# Patient Record
Sex: FEMALE | Race: BLACK OR AFRICAN AMERICAN | NOT HISPANIC OR LATINO | Employment: FULL TIME | URBAN - METROPOLITAN AREA
[De-identification: names, ages, dates, MRNs, and addresses within clinical notes are randomized per-mention and may not be internally consistent; named-entity substitution may affect disease eponyms.]

---

## 2020-05-14 ENCOUNTER — OFFICE VISIT (OUTPATIENT)
Dept: OBGYN CLINIC | Facility: OTHER | Age: 60
End: 2020-05-14
Payer: COMMERCIAL

## 2020-05-14 VITALS
HEIGHT: 63 IN | BODY MASS INDEX: 38.45 KG/M2 | WEIGHT: 217 LBS | SYSTOLIC BLOOD PRESSURE: 116 MMHG | DIASTOLIC BLOOD PRESSURE: 86 MMHG | HEART RATE: 110 BPM

## 2020-05-14 DIAGNOSIS — M75.41 IMPINGEMENT SYNDROME OF RIGHT SHOULDER: ICD-10-CM

## 2020-05-14 DIAGNOSIS — M75.31 CALCIFIC TENDONITIS OF RIGHT SHOULDER: ICD-10-CM

## 2020-05-14 DIAGNOSIS — S46.811S TRAUMATIC TEAR OF SUPRASPINATUS TENDON OF RIGHT SHOULDER, SEQUELA: ICD-10-CM

## 2020-05-14 DIAGNOSIS — M24.811 INTERNAL DERANGEMENT OF RIGHT SHOULDER: Primary | ICD-10-CM

## 2020-05-14 PROCEDURE — 99203 OFFICE O/P NEW LOW 30 MIN: CPT | Performed by: ORTHOPAEDIC SURGERY

## 2020-05-14 RX ORDER — GLUCOSAMINE/CHONDR SU A SOD 750-600 MG
1 TABLET ORAL DAILY
COMMUNITY
Start: 2020-04-06

## 2020-05-14 RX ORDER — ATORVASTATIN CALCIUM 10 MG/1
5 TABLET, FILM COATED ORAL DAILY
COMMUNITY
Start: 2020-03-25 | End: 2020-09-21

## 2020-05-14 RX ORDER — ASPIRIN 81 MG/1
81 TABLET ORAL DAILY
COMMUNITY

## 2020-05-14 RX ORDER — OMEPRAZOLE 40 MG/1
40 CAPSULE, DELAYED RELEASE ORAL DAILY
COMMUNITY
Start: 2020-01-08

## 2020-05-14 RX ORDER — LABETALOL 100 MG/1
100 TABLET, FILM COATED ORAL 2 TIMES DAILY
COMMUNITY
Start: 2020-05-10

## 2021-04-16 ENCOUNTER — IMMUNIZATIONS (OUTPATIENT)
Dept: FAMILY MEDICINE CLINIC | Facility: HOSPITAL | Age: 61
End: 2021-04-16

## 2021-04-16 DIAGNOSIS — Z23 ENCOUNTER FOR IMMUNIZATION: Primary | ICD-10-CM

## 2021-04-16 PROCEDURE — 0001A SARS-COV-2 / COVID-19 MRNA VACCINE (PFIZER-BIONTECH) 30 MCG: CPT

## 2021-04-16 PROCEDURE — 91300 SARS-COV-2 / COVID-19 MRNA VACCINE (PFIZER-BIONTECH) 30 MCG: CPT

## 2021-05-11 ENCOUNTER — IMMUNIZATIONS (OUTPATIENT)
Dept: FAMILY MEDICINE CLINIC | Facility: HOSPITAL | Age: 61
End: 2021-05-11

## 2021-05-11 DIAGNOSIS — Z23 ENCOUNTER FOR IMMUNIZATION: Primary | ICD-10-CM

## 2021-05-11 PROCEDURE — 0002A SARS-COV-2 / COVID-19 MRNA VACCINE (PFIZER-BIONTECH) 30 MCG: CPT

## 2021-05-11 PROCEDURE — 91300 SARS-COV-2 / COVID-19 MRNA VACCINE (PFIZER-BIONTECH) 30 MCG: CPT

## 2021-07-27 ENCOUNTER — OFFICE VISIT (OUTPATIENT)
Dept: AUDIOLOGY | Age: 61
End: 2021-07-27
Payer: COMMERCIAL

## 2021-07-27 DIAGNOSIS — H90.3 SENSORY HEARING LOSS, BILATERAL: Primary | ICD-10-CM

## 2021-07-27 PROCEDURE — 92567 TYMPANOMETRY: CPT | Performed by: AUDIOLOGIST

## 2021-07-27 PROCEDURE — 92557 COMPREHENSIVE HEARING TEST: CPT | Performed by: AUDIOLOGIST

## 2021-07-27 PROCEDURE — 92591 HB HEARING AID EXAM BOTH EARS: CPT | Performed by: AUDIOLOGIST

## 2021-07-27 NOTE — PROGRESS NOTES
HEARING EVALUATION    Name:  Salena Guajardo  :  1960  Age:  63 y o  Date of Evaluation: 21     History: Known Hearing Loss binaurally  Reason for visit: Salena Guajardo is being seen today at the request of Dr Leona alejandro  provider found for an evaluation of hearing  Patient reports she has worn hearing aids for the past 11 to 12 years  She has extensive family history of hearing loss including her mother, siblings, aunts, uncles and cousins  EVALUATION:    Otoscopic Evaluation:   Right Ear: Minimum cerumen    Left Ear: Minimum cerumen     Tympanometry:   Right: Type A - normal middle ear pressure and compliance   Left: Type A - normal middle ear pressure and compliance    Audiogram Results: Moderate to severe sensorineural hearing loss in a cookie bite configuration bilaterally  WRS is excellent bilaterally  *see attached audiogram      RECOMMENDATIONS:  Annual hearing eval, Return to [facility]  for F/U and Copy to Patient/Caregiver    PATIENT EDUCATION:   Discussed results and recommendations with patient  Questions were addressed and the patient was encouraged to contact our department should concerns arise        Naren Jean ,  CCC-A  Clinical Audiologist

## 2021-07-27 NOTE — PROGRESS NOTES
Hearing Aid Evaluation  Name:  Estephania Mc  :  1960  Age:  63 y o  Date of Evaluation: 21     Audiologic Results: Audiologic testing was performed on 2021, testing revealed moderate to severe sensorineural hearing loss bilaterally  Amplification is recommended binaurally    Hearing Aid Evaluation:  Hearing aid styles and technology were discussed with the patient  Possible hearing aid options, programs, and accessories were discussed  Pricing options and technology levels were discussed to determine the appropriate device to recommend  The patient was referred by [facility]  She currently wears Oticon Opn miniRITE hearing aids  Recommendation/Quote: The first hearing aid recommendation is  Oticon More 1 miniRITE R  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

## 2021-09-09 NOTE — PROGRESS NOTES
Progress Note    Name:  Jamaal Ocasio  :  1960  Age:  63 y o  Date of Evaluation: 21     Patients hearing aids and connect clip arrived  Oticon More 2 miniRITE-R  Right SN: [id number]  Left SN: [id number]  Warranty: 10/07/2024     SN: [id number]  Warranty: 10/07/2024    Connect Clip SN: [id number]  Warranty: 10/07/2022    Patient scheduled with Paul Hassan on         Jahaira Wallace , CCC-A  Clinical Audiologist

## 2021-09-17 ENCOUNTER — OFFICE VISIT (OUTPATIENT)
Dept: AUDIOLOGY | Age: 61
End: 2021-09-17
Payer: COMMERCIAL

## 2021-09-17 DIAGNOSIS — H90.3 SENSORY HEARING LOSS, BILATERAL: Primary | ICD-10-CM

## 2021-09-17 PROCEDURE — V5261 HEARING AID, DIGIT, BIN, BTE: HCPCS | Performed by: AUDIOLOGIST

## 2021-09-17 PROCEDURE — V5274 ALD UNSPECIFIED: HCPCS | Performed by: AUDIOLOGIST

## 2021-09-17 PROCEDURE — V5160 DISPENSING FEE BINAURAL: HCPCS | Performed by: AUDIOLOGIST

## 2021-09-17 NOTE — PROGRESS NOTES
Hearing Aid Fitting    Name:  Estephania Mc  :  1960  Age:  63 y o  Date of Evaluation: 21     Estephania Mc is being see today to be fit with new hearing aids  Patient is fit with Oticon More 2 hearing aid(s)  Right serial number [id number]  Left serial number [id number]  Warranty date: 10/7/2024 (Loss/Damage and repair)  Ear mold Battery  Dome   Right --- R 2-85 8DV   Left --- R 2-85 8DV     The hearing aid(s) were adjusted based on the patient's most recent audiogram and patient comfort  Patient noted good sound quality, and was happy with the fitting  Care and cleaning of the hearing aids was reviewed  Domes, filters, and batteries and user manual were reviewed with the patient  Insertion and removal of the hearing aids was done  The patient practiced insertion and removal of the devices in the office, they demonstrated excellent ability to manipulate the hearing aids  Telephone use was reviewed with the patient  The patient expressed satisfaction with the hearing aids  Recommendation:   Follow up in two weeks           Naren Cormier , CCC-A  Clinical Audiologist

## 2021-09-20 ENCOUNTER — OFFICE VISIT (OUTPATIENT)
Dept: AUDIOLOGY | Age: 61
End: 2021-09-20

## 2021-09-20 DIAGNOSIS — H90.3 SENSORY HEARING LOSS, BILATERAL: Primary | ICD-10-CM

## 2021-09-20 NOTE — PROGRESS NOTES
Progress Note    Name:  Estephania Mc  :  1960  Age:  63 y o  Date of Evaluation: 21     Patient walked in and stated the "wires are on backwards"  Patient was referring to the sport locks  Visual inspection revealed they were on backwards and curling in the wrong direction in the kami bowl  Patient stated she changed them because they were too long and put one of her old sport locks on  Patient was counseled that they were put on backwards and reviewed how they should be fit  Two new sport locks were added  Patient was happy with the fit of the sport locks         Jahaira Hill , CCC-A  Clinical Audiologist

## 2021-10-04 ENCOUNTER — OFFICE VISIT (OUTPATIENT)
Dept: AUDIOLOGY | Age: 61
End: 2021-10-04

## 2021-10-04 DIAGNOSIS — H90.3 SENSORY HEARING LOSS, BILATERAL: Primary | ICD-10-CM

## 2022-05-02 ENCOUNTER — OFFICE VISIT (OUTPATIENT)
Dept: AUDIOLOGY | Age: 62
End: 2022-05-02

## 2022-05-02 DIAGNOSIS — H90.3 SENSORY HEARING LOSS, BILATERAL: Primary | ICD-10-CM

## 2022-05-02 NOTE — PROGRESS NOTES
Hearing Aid Visit:    Name:  Madina Ortiz  :  1960  Age:  63 y o  Date of Evaluation: 22     Madina Ortiz is being seen for a hearing aid visit  Patient is fit with Oticon More 2 hearing aid(s)  Right serial [id number]  Left serial [id number]  Warranty date: 10/7/2024 (Loss/Damage and repair)     Dome/Earmold: 10DB   : Right  / Left  with sport lock     Patient reports hearing aids not paired to phone  Action:  The patients hearing aid(s) were connected to their cell phone via bluetooth  Volume control via the "triple click" of the power button on the phone was reviewed  Recommendations: Follow up PRN        Jahaira Nair , ARIADNA-A  Clinical Audiologist

## 2023-01-23 ENCOUNTER — OFFICE VISIT (OUTPATIENT)
Dept: AUDIOLOGY | Age: 63
End: 2023-01-23

## 2023-01-23 DIAGNOSIS — H90.3 SENSORY HEARING LOSS, BILATERAL: Primary | ICD-10-CM

## 2023-10-30 ENCOUNTER — OFFICE VISIT (OUTPATIENT)
Dept: AUDIOLOGY | Age: 63
End: 2023-10-30

## 2023-10-30 DIAGNOSIS — H90.3 SENSORY HEARING LOSS, BILATERAL: Primary | ICD-10-CM

## 2023-10-30 NOTE — PROGRESS NOTES
Hearing Aid Visit:    Name:  Shmuel Liang  :  1960  Age:  66 y.o. Date of Evaluation: 10/30/23     Shmuel Liang is being seen for a hearing aid visit. Patient is fit with [employer] More 2 hearing aid(s). Right serial number [id number]. Left serial number [id number]. Warranty date: 10/7/2024 (Loss/Damage and repair). Dome/Earmold: 8DB   : Right  / Left 3/85 with sport lock     Patient reports the left hearing aid is causing some discomfort. Action:  Cleaned and checked both hearing aids. Replaced domes and filters. Replaced left  wire  with a 3/85 for better fit. Dispensed a pack of domes and filters. Recommendations: Follow PRN. Get set up with [facility] in [place]. [facility] audiology information.       Shirley Terrell, [facility]-A  Clinical Audiologist

## 2025-05-01 ENCOUNTER — APPOINTMENT (EMERGENCY)
Dept: RADIOLOGY | Facility: HOSPITAL | Age: 65
End: 2025-05-01
Payer: COMMERCIAL

## 2025-05-01 ENCOUNTER — HOSPITAL ENCOUNTER (EMERGENCY)
Facility: HOSPITAL | Age: 65
Discharge: LEFT BEFORE DISCHARGE | End: 2025-05-01
Attending: STUDENT IN AN ORGANIZED HEALTH CARE EDUCATION/TRAINING PROGRAM
Payer: COMMERCIAL

## 2025-05-01 VITALS
RESPIRATION RATE: 18 BRPM | OXYGEN SATURATION: 98 % | HEART RATE: 71 BPM | DIASTOLIC BLOOD PRESSURE: 92 MMHG | SYSTOLIC BLOOD PRESSURE: 197 MMHG | TEMPERATURE: 98 F

## 2025-05-01 DIAGNOSIS — R93.89 ABNORMAL CT SCAN: ICD-10-CM

## 2025-05-01 DIAGNOSIS — G43.909 MIGRAINE: Primary | ICD-10-CM

## 2025-05-01 LAB
ANION GAP SERPL CALCULATED.3IONS-SCNC: 13 MMOL/L (ref 4–13)
BASOPHILS # BLD AUTO: 0.05 THOUSANDS/ÂΜL (ref 0–0.1)
BASOPHILS NFR BLD AUTO: 1 % (ref 0–1)
BUN SERPL-MCNC: 13 MG/DL (ref 5–25)
CALCIUM SERPL-MCNC: 10 MG/DL (ref 8.4–10.2)
CHLORIDE SERPL-SCNC: 104 MMOL/L (ref 96–108)
CO2 SERPL-SCNC: 21 MMOL/L (ref 21–32)
CREAT SERPL-MCNC: 0.87 MG/DL (ref 0.6–1.3)
EOSINOPHIL # BLD AUTO: 0.18 THOUSAND/ÂΜL (ref 0–0.61)
EOSINOPHIL NFR BLD AUTO: 2 % (ref 0–6)
ERYTHROCYTE [DISTWIDTH] IN BLOOD BY AUTOMATED COUNT: 14.2 % (ref 11.6–15.1)
GFR SERPL CREATININE-BSD FRML MDRD: 71 ML/MIN/1.73SQ M
GLUCOSE SERPL-MCNC: 97 MG/DL (ref 65–140)
HCT VFR BLD AUTO: 48.8 % (ref 34.8–46.1)
HGB BLD-MCNC: 15.9 G/DL (ref 11.5–15.4)
IMM GRANULOCYTES # BLD AUTO: 0.03 THOUSAND/UL (ref 0–0.2)
IMM GRANULOCYTES NFR BLD AUTO: 0 % (ref 0–2)
LYMPHOCYTES # BLD AUTO: 2.22 THOUSANDS/ÂΜL (ref 0.6–4.47)
LYMPHOCYTES NFR BLD AUTO: 28 % (ref 14–44)
MCH RBC QN AUTO: 28.6 PG (ref 26.8–34.3)
MCHC RBC AUTO-ENTMCNC: 32.6 G/DL (ref 31.4–37.4)
MCV RBC AUTO: 88 FL (ref 82–98)
MONOCYTES # BLD AUTO: 0.75 THOUSAND/ÂΜL (ref 0.17–1.22)
MONOCYTES NFR BLD AUTO: 10 % (ref 4–12)
NEUTROPHILS # BLD AUTO: 4.62 THOUSANDS/ÂΜL (ref 1.85–7.62)
NEUTS SEG NFR BLD AUTO: 59 % (ref 43–75)
NRBC BLD AUTO-RTO: 0 /100 WBCS
PLATELET # BLD AUTO: 251 THOUSANDS/UL (ref 149–390)
PMV BLD AUTO: 9.3 FL (ref 8.9–12.7)
POTASSIUM SERPL-SCNC: 3.8 MMOL/L (ref 3.5–5.3)
RBC # BLD AUTO: 5.55 MILLION/UL (ref 3.81–5.12)
SODIUM SERPL-SCNC: 138 MMOL/L (ref 135–147)
WBC # BLD AUTO: 7.85 THOUSAND/UL (ref 4.31–10.16)

## 2025-05-01 PROCEDURE — 96366 THER/PROPH/DIAG IV INF ADDON: CPT

## 2025-05-01 PROCEDURE — 70496 CT ANGIOGRAPHY HEAD: CPT

## 2025-05-01 PROCEDURE — 96365 THER/PROPH/DIAG IV INF INIT: CPT

## 2025-05-01 PROCEDURE — 70498 CT ANGIOGRAPHY NECK: CPT

## 2025-05-01 PROCEDURE — 96361 HYDRATE IV INFUSION ADD-ON: CPT

## 2025-05-01 PROCEDURE — 99283 EMERGENCY DEPT VISIT LOW MDM: CPT

## 2025-05-01 PROCEDURE — 96375 TX/PRO/DX INJ NEW DRUG ADDON: CPT

## 2025-05-01 PROCEDURE — 99285 EMERGENCY DEPT VISIT HI MDM: CPT | Performed by: STUDENT IN AN ORGANIZED HEALTH CARE EDUCATION/TRAINING PROGRAM

## 2025-05-01 PROCEDURE — 36415 COLL VENOUS BLD VENIPUNCTURE: CPT | Performed by: STUDENT IN AN ORGANIZED HEALTH CARE EDUCATION/TRAINING PROGRAM

## 2025-05-01 PROCEDURE — 80048 BASIC METABOLIC PNL TOTAL CA: CPT | Performed by: STUDENT IN AN ORGANIZED HEALTH CARE EDUCATION/TRAINING PROGRAM

## 2025-05-01 PROCEDURE — 85025 COMPLETE CBC W/AUTO DIFF WBC: CPT | Performed by: STUDENT IN AN ORGANIZED HEALTH CARE EDUCATION/TRAINING PROGRAM

## 2025-05-01 RX ORDER — MAGNESIUM SULFATE HEPTAHYDRATE 40 MG/ML
2 INJECTION, SOLUTION INTRAVENOUS ONCE
Status: COMPLETED | OUTPATIENT
Start: 2025-05-01 | End: 2025-05-01

## 2025-05-01 RX ORDER — METOCLOPRAMIDE HYDROCHLORIDE 5 MG/ML
10 INJECTION INTRAMUSCULAR; INTRAVENOUS ONCE
Status: COMPLETED | OUTPATIENT
Start: 2025-05-01 | End: 2025-05-01

## 2025-05-01 RX ORDER — ACETAMINOPHEN 10 MG/ML
1000 INJECTION, SOLUTION INTRAVENOUS ONCE
Status: DISCONTINUED | OUTPATIENT
Start: 2025-05-01 | End: 2025-05-01 | Stop reason: HOSPADM

## 2025-05-01 RX ORDER — LABETALOL HYDROCHLORIDE 5 MG/ML
10 INJECTION, SOLUTION INTRAVENOUS ONCE
Status: COMPLETED | OUTPATIENT
Start: 2025-05-01 | End: 2025-05-01

## 2025-05-01 RX ORDER — DROPERIDOL 2.5 MG/ML
0.62 INJECTION, SOLUTION INTRAMUSCULAR; INTRAVENOUS ONCE
Status: COMPLETED | OUTPATIENT
Start: 2025-05-01 | End: 2025-05-01

## 2025-05-01 RX ADMIN — SODIUM CHLORIDE 1000 ML: 0.9 INJECTION, SOLUTION INTRAVENOUS at 11:59

## 2025-05-01 RX ADMIN — IOHEXOL 85 ML: 350 INJECTION, SOLUTION INTRAVENOUS at 13:05

## 2025-05-01 RX ADMIN — MAGNESIUM SULFATE HEPTAHYDRATE 2 G: 40 INJECTION, SOLUTION INTRAVENOUS at 12:51

## 2025-05-01 RX ADMIN — LABETALOL HYDROCHLORIDE 10 MG: 5 INJECTION, SOLUTION INTRAVENOUS at 14:58

## 2025-05-01 RX ADMIN — DROPERIDOL 0.62 MG: 2.5 INJECTION, SOLUTION INTRAMUSCULAR; INTRAVENOUS at 13:36

## 2025-05-01 RX ADMIN — METOCLOPRAMIDE 10 MG: 5 INJECTION, SOLUTION INTRAMUSCULAR; INTRAVENOUS at 12:01

## 2025-05-01 NOTE — ED NOTES
Patient requesting to leave AMA, provider made aware and at bedside.     Casey Schoener, EMILIANA  05/01/25 [id number]

## 2025-05-01 NOTE — DISCHARGE INSTRUCTIONS
As discussed please follow-up with your neurologist soon as possible.  Return to the emergency room for any worsening/recurrent headache, numbness, tingling, weakness, or for any other new or concerning symptoms.

## 2025-05-01 NOTE — ED PROVIDER NOTES
Time reflects when diagnosis was documented in both MDM as applicable and the Disposition within this note       Time User Action Codes Description Comment    5/1/2025  3:26 PM Teo Mueller Add [G43.909] Migraine     5/1/2025  3:26 PM Teo Mueller Add [R93.89] Abnormal CT scan           ED Disposition       ED Disposition   AMA    Condition   --    Date/Time   Thu May 1, 2025  3:26 PM    Comment   Date: 5/1/2025  Patient: Laverne Hilliard  Admitted: 5/1/2025 11:37 AM  Attending Provider: Teo Mueller DO    Laverne Hilliard or her authorized caregiver has made the decision for the patient to leave the emergency department against the advice of h er attending physician. She or her authorized caregiver has been informed and understands the inherent risks, including death, persistent vegetative state, stroke, intracranial bleeding, pain.  She or her authorized caregiver has decided to accept [facility] responsibility for this decision. Laverne Hilliard and all necessary parties have been advised that she may return for further evaluation or treatment. Her condition at time of discharge was guarded.  Laverne Hilliard had current vital signs as follows:   BP (!) 197/92   Pulse 71   Temp 98 °F (36.7 °C) (Oral)   Resp 18                Assessment & Plan       Medical Decision Making  Patient is a 65 y.o. female who presents to the ED for a headache/migraine.  Patient is nontoxic, well-appearing..    Presentation most consistent with migraine. Differential diagnosis includes tension type headache, complex migraine. . Neurologic exam without evidence of meningismus or focal neurologic findings. Presentation not consistent with acute SAH (lack of risk factors, has headache history). Presentation not consistent with acute CNS infection, including meningitis or brain abscess. Presentation not consistent with temporal arteritis or acute angle closure glaucoma. Presentation not consistent with other acute, emergent causes of headache  "at this time.     Plan: Labs, CTA, reassess               Portions of the record may have been created with voice recognition software. Occasional wrong word or \"sound a like\" substitutions may have occurred due to the inherent limitations of voice recognition software. Read the chart carefully and recognize, using context, where substitutions have occurred.    Amount and/or Complexity of Data Reviewed  Labs: ordered.  Radiology: ordered.    Risk  Prescription drug management.        ED Course as of 05/01/25 1635   Thu May 01, 2025   1420 Discussed history and physical, as well as results of CT scan with neurology.  Will obtain MRI from the ED.   1527 Patient states she would like to leave and does not want to wait.  Does not want the MRI performed.  Explained she will go to an MRI closer to her house.  Explained that leaving would be AGAINST MEDICAL ADVICE and there is a possibility of stroke/TIA.  Risk factors of leaving discussed.  Offered anxiolytics to help with MRI and waiting but patient refused.  Will have patient sign out AGAINST MEDICAL ADVICE.   1528 The patient insists on leaving against medical advice, despite my recommendation to remain for ongoing treatment.    1: Capacity: I have determined that the patient has capacity to make the decision to leave against medical advice based on the following:    A. Ability to express a choice: The patient is able to express his or her choice and communicate that choice.   B. Ability to understand relevant information: The patient is able to verbalize their diagnosis, understand information about the purpose of treatment, remember the information, and show that he or she can be part of the decision-making process.    C. Ability to appreciate the significance of the information and its consequences: The patient understands the consequences of treatment refusal and the risks and benefits of accepting or refusing treatment.    D. Ability to manipulate information: The " patient is able to engage in reasoning as it applies to making treatment decisions   2: Psychiatric Consultation: There is not an indication to call psychiatry consultation to determine capacity    3. Alternative Treatment: I have discussed the recommended course of treatment and available alternatives.   4. Risks: I have discussed the specific risks of that patient refusing treatment    5. Follow-up Care: I have discussed the follow-up care and advised to see patient's PCP immediately or return here for worsening.   6. ED Option: I have emphasized that the patient has the option to return to the ED. Reviewed that we can have continuation of the workup at any time and that we are always open.        Medications   acetaminophen (Ofirmev) injection 1,000 mg (1,000 mg Intravenous Not Given 5/1/25 1203)   metoclopramide (REGLAN) injection 10 mg (10 mg Intravenous Given 5/1/25 1201)   magnesium sulfate 2 g/50 mL IVPB (premix) 2 g (0 g Intravenous Stopped 5/1/25 1533)   sodium chloride 0.9 % bolus 1,000 mL (0 mL Intravenous Stopped 5/1/25 1533)   iohexol (OMNIPAQUE) 350 MG/ML injection (MULTI-DOSE) 85 mL (85 mL Intravenous Given 5/1/25 1305)   droperidol (INAPSINE) injection 0.625 mg (0.625 mg Intravenous Given 5/1/25 1336)   labetalol (NORMODYNE) injection 10 mg (10 mg Intravenous Given 5/1/25 1458)       ED Risk Strat Scores                    No data recorded                            History of Present Illness       Chief Complaint   Patient presents with    Migraine     Pt. C/o migraine that started today with some nauseous and numbness on L hand.       Past Medical History:   Diagnosis Date    Diabetes 1.5, managed as type 1 (HCC)     Hyperlipemia     Hypertension       Past Surgical History:   Procedure Laterality Date    GALLBLADDER SURGERY      INGUINAL HERNIA REPAIR      UMBILICAL HERNIA REPAIR        History reviewed. No pertinent family history.   Social History     Tobacco Use    Smoking status: Never     Smokeless tobacco: Never   Substance Use Topics    Alcohol use: Yes     Comment: social    Drug use: Never      E-Cigarette/Vaping      E-Cigarette/Vaping Substances      I have reviewed and agree with the history as documented.     65-year-old female, past medical history including hyperlipidemia, hypertension, diabetes, as well as recurrent migraines, who presents emerged part for a migraine.  Started last night.  Bifrontal.  No modifying factors.  Associated with transient left hand numbness and facial numbness.  Has had similar migraines in the past but does not usually have other symptoms with it.  Currently denies any numbness, tingling, weakness, visual changes.  She has no other complaints or concerns.      Migraine  Associated symptoms: headaches        Review of Systems   Neurological:  Positive for headaches.   All other systems reviewed and are negative.          Objective       ED Triage Vitals [05/01/25 1144]   Temperature Pulse Blood Pressure Respirations SpO2 Patient Position - Orthostatic VS   98 °F (36.7 °C) 60 (!) 193/89 21 100 % Lying      Temp Source Heart Rate Source BP Location FiO2 (%) Pain Score    Oral Monitor Left arm -- --      Vitals      Date and Time Temp Pulse SpO2 Resp BP Pain Score FACES Pain Rating User   05/01/25 1345 -- 71 98 % -- 197/92 -- -- DADA   05/01/25 1341 -- 64 98 % 18 204/92 -- -- CS   05/01/25 1144 98 °F (36.7 °C) 60 100 % 21 193/89 -- -- CS            Physical Exam  Vitals and nursing note reviewed.   Constitutional:       General: She is not in acute distress.     Appearance: She is well-developed. She is not diaphoretic.   HENT:      Head: Normocephalic and atraumatic.      Right Ear: External ear normal.      Left Ear: External ear normal.      Nose: Nose normal.   Eyes:      General: Lids are normal. No scleral icterus.  Cardiovascular:      Rate and Rhythm: Normal rate and regular rhythm.      Heart sounds: Normal heart sounds. No murmur heard.     No friction rub.  No gallop.   Pulmonary:      Effort: Pulmonary effort is normal. No respiratory distress.      Breath sounds: Normal breath sounds. No wheezing or rales.   Abdominal:      Palpations: Abdomen is soft.      Tenderness: There is no abdominal tenderness. There is no guarding or rebound.   Musculoskeletal:         General: No deformity. Normal range of motion.      Cervical back: Normal range of motion and neck supple.   Skin:     General: Skin is warm and dry.   Neurological:      General: No focal deficit present.      Mental Status: She is alert and oriented to person, place, and time.      GCS: GCS eye subscore is 4. GCS verbal subscore is 5. GCS motor subscore is 6.      Cranial Nerves: Cranial nerves 2-12 are intact. No cranial nerve deficit or facial asymmetry.      Sensory: Sensation is intact. No sensory deficit.      Motor: Motor function is intact. No weakness.   Psychiatric:         Mood and Affect: Mood normal.         Behavior: Behavior normal.         Results Reviewed       Procedure Component Value Units Date/Time    Basic metabolic panel [[id number]] Collected: 05/01/25 1158    Lab Status: Final result Specimen: Blood from Arm, Right Updated: 05/01/25 1247     Sodium 138 mmol/L      Potassium 3.8 mmol/L      Chloride 104 mmol/L      CO2 21 mmol/L      ANION GAP 13 mmol/L      BUN 13 mg/dL      Creatinine 0.87 mg/dL      Glucose 97 mg/dL      Calcium 10.0 mg/dL      eGFR 71 ml/min/1.73sq m     Narrative:      National Kidney Disease Foundation guidelines for Chronic Kidney Disease (CKD):     Stage 1 with normal or high GFR (GFR > 90 mL/min/1.73 square meters)    Stage 2 Mild CKD (GFR = 60-89 mL/min/1.73 square meters)    Stage 3A Moderate CKD (GFR = 45-59 mL/min/1.73 square meters)    Stage 3B Moderate CKD (GFR = 30-44 mL/min/1.73 square meters)    Stage 4 Severe CKD (GFR = 15-29 mL/min/1.73 square meters)    Stage 5 End Stage CKD (GFR <15 mL/min/1.73 square meters)  Note: GFR calculation is accurate only  with a steady state creatinine    CBC and differential [[id number]]  (Abnormal) Collected: 05/01/25 1158    Lab Status: Final result Specimen: Blood from Arm, Right Updated: 05/01/25 1209     WBC 7.85 Thousand/uL      RBC 5.55 Million/uL      Hemoglobin 15.9 g/dL      Hematocrit 48.8 %      MCV 88 fL      MCH 28.6 pg      MCHC 32.6 g/dL      RDW 14.2 %      MPV 9.3 fL      Platelets 251 Thousands/uL      nRBC 0 /100 WBCs      Segmented % 59 %      Immature Grans % 0 %      Lymphocytes % 28 %      Monocytes % 10 %      Eosinophils Relative 2 %      Basophils Relative 1 %      Absolute Neutrophils 4.62 Thousands/µL      Absolute Immature Grans 0.03 Thousand/uL      Absolute Lymphocytes 2.22 Thousands/µL      Absolute Monocytes 0.75 Thousand/µL      Eosinophils Absolute 0.18 Thousand/µL      Basophils Absolute 0.05 Thousands/µL             CTA head and neck with and without contrast   Final Interpretation by Marquez Reilly MD (05/01 1415)   1.  No acute intracranial hemorrhage, proximal large vessel occlusion in the head and neck or high-grade vascular stenosis.   2.  Patchy nonspecific white matter hypoattenuation, noting asymmetry in the periventricular region surrounding the right occipital horn and extending to the right temporal horn. Recommend comparison with remote priors, which are currently unavailable in    our system for review, and consider brain MRI for further evaluation, as clinically appropriate, if there is concern for acute pathology, including infarct and/or edema.      The study was marked in EPIC for immediate notification.      Workstation performed: [id number]             Procedures    ED Medication and Procedure Management   Prior to Admission Medications   Prescriptions Last Dose Informant Patient Reported? Taking?   RA VITAMIN D-3 50 MCG (2000 UT) CAPS   Yes No   Sig: Take 1 capsule by mouth daily   aspirin (ECOTRIN LOW STRENGTH) 81 mg EC tablet   Yes No   Sig: Take 81 mg by mouth daily    atorvastatin (LIPITOR) 10 mg tablet   Yes No   Sig: Take 5 mg by mouth daily   labetalol (NORMODYNE) 100 mg tablet   Yes No   Sig: Take 100 mg by mouth 2 (two) times a day   metFORMIN (GLUCOPHAGE) 500 mg tablet   Yes No   Sig: Take 500 mg by mouth   omeprazole (PriLOSEC) 40 MG capsule   Yes No   Sig: Take 40 mg by mouth daily      Facility-Administered Medications: None     Discharge Medication List as of 5/1/2025  3:28 PM        CONTINUE these medications which have NOT CHANGED    Details   aspirin (ECOTRIN LOW STRENGTH) 81 mg EC tablet Take 81 mg by mouth daily, Historical Med      atorvastatin (LIPITOR) 10 mg tablet Take 5 mg by mouth daily, Starting Wed 3/25/2020, Until Mon 9/21/2020, Historical Med      labetalol (NORMODYNE) 100 mg tablet Take 100 mg by mouth 2 (two) times a day, Starting Sun 5/10/2020, Historical Med      metFORMIN (GLUCOPHAGE) 500 mg tablet Take 500 mg by mouth, Starting Wed 2/19/2020, Until Mon 8/17/2020, Historical Med      omeprazole (PriLOSEC) 40 MG capsule Take 40 mg by mouth daily, Starting Wed 1/8/2020, Historical Med      RA VITAMIN D-3 50 MCG (2000 UT) CAPS Take 1 capsule by mouth daily, Starting Mon 4/6/2020, Historical Med           No discharge procedures on file.  ED SEPSIS DOCUMENTATION   Time reflects when diagnosis was documented in both MDM as applicable and the Disposition within this note       Time User Action Codes Description Comment    5/1/2025  3:26 PM Teo Mueller [G43.909] Migraine     5/1/2025  3:26 PM Teo Mueller [R93.89] Abnormal CT scan                  Teo Mueller,   05/01/25 1636

## 2025-05-01 NOTE — ED NOTES
Patient friend at bedside aggravated and requesting opioids for her friend. Used calming methods to deescalate patients friend. Educated patients friend on the use of the medications that the doctor ordered.     Casey Schoener, RN  05/01/25 3670